# Patient Record
Sex: FEMALE | Race: BLACK OR AFRICAN AMERICAN | NOT HISPANIC OR LATINO | Employment: PART TIME | URBAN - METROPOLITAN AREA
[De-identification: names, ages, dates, MRNs, and addresses within clinical notes are randomized per-mention and may not be internally consistent; named-entity substitution may affect disease eponyms.]

---

## 2019-10-24 ENCOUNTER — OFFICE VISIT (OUTPATIENT)
Dept: FAMILY MEDICINE | Facility: CLINIC | Age: 29
End: 2019-10-24
Payer: MEDICAID

## 2019-10-24 VITALS
SYSTOLIC BLOOD PRESSURE: 116 MMHG | DIASTOLIC BLOOD PRESSURE: 68 MMHG | TEMPERATURE: 98 F | HEIGHT: 68 IN | RESPIRATION RATE: 16 BRPM | WEIGHT: 123.38 LBS | BODY MASS INDEX: 18.7 KG/M2

## 2019-10-24 DIAGNOSIS — Z76.89 ENCOUNTER TO ESTABLISH CARE: ICD-10-CM

## 2019-10-24 DIAGNOSIS — R63.4 WEIGHT LOSS, NON-INTENTIONAL: ICD-10-CM

## 2019-10-24 DIAGNOSIS — J30.89 ENVIRONMENTAL AND SEASONAL ALLERGIES: ICD-10-CM

## 2019-10-24 DIAGNOSIS — Z00.00 WELLNESS EXAMINATION: Primary | ICD-10-CM

## 2019-10-24 LAB
ABS NRBC COUNT: 0 X 10 3/UL (ref 0–0.01)
ABSOLUTE BASOPHIL: 0.04 X 10 3/UL (ref 0–0.22)
ABSOLUTE EOSINOPHIL: 0.05 X 10 3/UL (ref 0.04–0.54)
ABSOLUTE IMMATURE GRAN: 0.02 X 10 3/UL (ref 0–0.04)
ABSOLUTE LYMPHOCYTE: 1.32 X 10 3/UL (ref 0.86–4.75)
ABSOLUTE MONOCYTE: 0.83 X 10 3/UL (ref 0.22–1.08)
ALBUMIN SERPL-MCNC: 4.3 G/DL (ref 3.5–5.2)
ALBUMIN/GLOB SERPL ELPH: 1.2 {RATIO} (ref 1–2.7)
ALP ISOS SERPL LEV INH-CCNC: 171 U/L (ref 35–105)
ALT (SGPT): 41 U/L (ref 0–33)
ANION GAP SERPL CALC-SCNC: 11 MMOL/L (ref 8–17)
AST SERPL-CCNC: 44 U/L (ref 0–32)
B12: 1693 PG/ML (ref 232–1245)
BASOPHILS NFR BLD: 0.6 % (ref 0.2–1.2)
BILIRUBIN, TOTAL: 0.5 MG/DL (ref 0–1.2)
BUN/CREAT SERPL: 11.9 (ref 6–20)
CALCIUM SERPL-MCNC: 9.5 MG/DL (ref 8.6–10.2)
CARBON DIOXIDE, CO2: 26 MMOL/L (ref 22–29)
CHLORIDE: 103 MMOL/L (ref 98–107)
CHOLEST SERPL-MSCNC: 163 MG/DL (ref 100–200)
CREAT SERPL-MCNC: 1.18 MG/DL (ref 0.5–0.9)
EOSINOPHIL NFR BLD: 0.7 % (ref 0.7–7)
ESTIMATED AVERAGE GLUCOSE: 114 MG/DL
GFR ESTIMATION: 54.94
GLOBULIN: 3.5 G/DL (ref 1.5–4.5)
GLUCOSE: 91 MG/DL (ref 74–106)
HBA1C MFR BLD: 5.6 % (ref 4–6)
HCT VFR BLD AUTO: 38.7 % (ref 37–47)
HDLC SERPL-MCNC: 66 MG/DL
HGB BLD-MCNC: 13.3 G/DL (ref 12–16)
IMMATURE GRANULOCYTES: 0.3 % (ref 0–0.5)
LDL/HDL RATIO: 1.2 (ref 1–3)
LDLC SERPL CALC-MCNC: 81.4 MG/DL (ref 0–100)
LYMPHOCYTES NFR BLD: 19.5 % (ref 19.3–53.1)
MCH RBC QN AUTO: 28.9 PG (ref 27–32)
MCHC RBC AUTO-ENTMCNC: 34.4 G/DL (ref 32–36)
MCV RBC AUTO: 83.9 FL (ref 82–100)
MONOCYTES NFR BLD: 12.3 % (ref 4.7–12.5)
NEUTROPHILS ABSOLUTE COUNT: 4.5 X 10 3/UL (ref 2.15–7.56)
NEUTROPHILS NFR BLD: 66.6 %
NUCLEATED RED BLOOD CELLS: 0 /100 WBC (ref 0–0.2)
PLATELET # BLD AUTO: 436 X 10 3/UL (ref 135–400)
POTASSIUM: 4.8 MMOL/L (ref 3.5–5.1)
PROT SNV-MCNC: 7.8 G/DL (ref 6.4–8.3)
RBC # BLD AUTO: 4.61 X 10 6/UL (ref 4.2–5.4)
RDW-SD: 39.3 FL (ref 37–54)
SODIUM: 140 MMOL/L (ref 136–145)
TRIGL SERPL-MCNC: 78 MG/DL (ref 0–150)
TSH SERPL DL<=0.005 MIU/L-ACNC: 2.79 UIU/ML (ref 0.27–4.2)
UREA NITROGEN (BUN): 14 MG/DL (ref 6–20)
VITAMIN D (25OHD): 18 NG/ML
WBC # BLD: 6.76 X 10 3/UL (ref 4.3–10.8)

## 2019-10-24 PROCEDURE — 99385 PR PREVENTIVE VISIT,NEW,18-39: ICD-10-PCS | Mod: S$GLB,,, | Performed by: FAMILY MEDICINE

## 2019-10-24 PROCEDURE — 99385 PREV VISIT NEW AGE 18-39: CPT | Mod: S$GLB,,, | Performed by: FAMILY MEDICINE

## 2019-10-24 RX ORDER — MONTELUKAST SODIUM 10 MG/1
10 TABLET ORAL NIGHTLY
Qty: 90 TABLET | Refills: 3 | Status: SHIPPED | OUTPATIENT
Start: 2019-10-24

## 2019-10-24 NOTE — PROGRESS NOTES
Subjective:       Patient ID: Ania Mcgovern is a 28 y.o. female.    Chief Complaint: Establish Care (Pt states that she is having problems breathing states her nose gets stops up for 3 weeks at a time.pt stats that she has lost her since of taste and states that she is conserned about her weight loss she use to weigh 160 now is 123)    27 yo F here to get established. Pt was not seen by PCP in the past. Pt is here for unintentional weight loss.  Pt moved about 2 months ago in her remodeled appt and that's when it all started: she could not breathe, could not smell and she ate less. She thinks she lost about 40 pounds in the past 2 months. Pt has no other side effects, no pain, no diarrhea, no sweating.   Pt has three kids, one in heaven, one in school.   Pt has been congested but it is getting better. This started when she moved into this appt. She is actually better at her sister's house across town.       Review of Systems   Constitutional: Negative for activity change, chills, fatigue, fever and unexpected weight change.   HENT: Negative for ear pain, rhinorrhea and trouble swallowing.    Eyes: Negative for pain.   Respiratory: Negative for cough, chest tightness, shortness of breath and wheezing.    Cardiovascular: Negative for chest pain and palpitations.   Gastrointestinal: Negative for abdominal distention, abdominal pain, constipation, diarrhea, nausea and vomiting.   Endocrine: Negative for cold intolerance and heat intolerance.   Genitourinary: Negative for dysuria, frequency and urgency.   Musculoskeletal: Negative for myalgias.   Skin: Negative for rash.   Neurological: Negative for dizziness, syncope, light-headedness and headaches.   Hematological: Does not bruise/bleed easily.   Psychiatric/Behavioral: Negative for agitation and confusion.       Objective:      Physical Exam   Constitutional: She appears well-developed.   HENT:   Right Ear: External ear normal.   Left Ear: External ear normal.    Mouth/Throat: Oropharynx is clear and moist.   Eyes: Conjunctivae and EOM are normal.   Neck: Normal range of motion.   Cardiovascular: Normal rate, regular rhythm and intact distal pulses.   Pulmonary/Chest: Effort normal and breath sounds normal.   Abdominal: Soft.   Skin: Skin is warm. Capillary refill takes less than 2 seconds.   Psychiatric: She has a normal mood and affect.       Assessment:       1. Wellness examination    2. Encounter to establish care    3. Weight loss, non-intentional    4. BMI less than 19,adult    5. Environmental and seasonal allergies        Plan:       PROBLEM LIST     Ania was seen today for establish care.    Diagnoses and all orders for this visit:    Wellness examination  -     CBC auto differential; Future  -     Comprehensive metabolic panel; Future  -     Lipid panel; Future  -     Vitamin D; Future  -     Hemoglobin A1c; Future  -     TSH; Future  -     Vitamin B12; Future  -     CBC auto differential  -     Comprehensive metabolic panel  -     Lipid panel  -     Vitamin D  -     Hemoglobin A1c  -     TSH  -     Vitamin B12    Encounter to establish care    Weight loss, non-intentional    BMI less than 19,adult    Environmental and seasonal allergies  -     montelukast (SINGULAIR) 10 mg tablet; Take 1 tablet (10 mg total) by mouth every evening.

## 2019-10-28 ENCOUNTER — OFFICE VISIT (OUTPATIENT)
Dept: FAMILY MEDICINE | Facility: CLINIC | Age: 29
End: 2019-10-28
Payer: MEDICAID

## 2019-10-28 VITALS
HEART RATE: 90 BPM | WEIGHT: 122 LBS | BODY MASS INDEX: 19.15 KG/M2 | DIASTOLIC BLOOD PRESSURE: 80 MMHG | HEIGHT: 67 IN | RESPIRATION RATE: 16 BRPM | SYSTOLIC BLOOD PRESSURE: 120 MMHG | TEMPERATURE: 98 F

## 2019-10-28 DIAGNOSIS — G47.00 INSOMNIA, UNSPECIFIED TYPE: ICD-10-CM

## 2019-10-28 DIAGNOSIS — R63.4 WEIGHT LOSS, NON-INTENTIONAL: ICD-10-CM

## 2019-10-28 DIAGNOSIS — R74.01 TRANSAMINITIS: ICD-10-CM

## 2019-10-28 DIAGNOSIS — N18.30 CKD (CHRONIC KIDNEY DISEASE), STAGE III: ICD-10-CM

## 2019-10-28 DIAGNOSIS — E55.9 VITAMIN D DEFICIENCY: Primary | ICD-10-CM

## 2019-10-28 DIAGNOSIS — F41.9 ANXIETY: ICD-10-CM

## 2019-10-28 PROCEDURE — 99214 PR OFFICE/OUTPT VISIT, EST, LEVL IV, 30-39 MIN: ICD-10-PCS | Mod: S$GLB,,, | Performed by: FAMILY MEDICINE

## 2019-10-28 PROCEDURE — 99214 OFFICE O/P EST MOD 30 MIN: CPT | Mod: S$GLB,,, | Performed by: FAMILY MEDICINE

## 2019-10-28 RX ORDER — HYDROXYZINE HYDROCHLORIDE 50 MG/1
50 TABLET, FILM COATED ORAL NIGHTLY PRN
Qty: 30 TABLET | Refills: 0 | Status: SHIPPED | OUTPATIENT
Start: 2019-10-28

## 2019-10-28 NOTE — PROGRESS NOTES
Subjective:       Patient ID: Ania Mcgovern is a 28 y.o. female.    Chief Complaint: Follow-up (Lab results)    27 yo F here to discuss lab results. They are pretty much normal besides a slight transaminitis.  Her vitamin D is low at 18 (pt started supplements just recently) and she has some kidney insufficiency which might be CKD III. Discussed to not get dehydrated.   Pt also tells me that she has anxiety, she is over thinking everything.   Pt started to drink ensure as she is keeps losing weight. She lost another pound in 5 days. Pt feels well, she does suffer from night sweats or anything. Her TSH is normal, her A1C is normal. She can eat and has no problems with that.   Discussed that we will have to test for tape worms or such if she doesn't start gaining here soon.       Review of Systems   Constitutional: Negative for activity change, chills, fatigue, fever and unexpected weight change.   HENT: Negative for ear pain, rhinorrhea and trouble swallowing.    Eyes: Negative for pain.   Respiratory: Negative for cough, chest tightness, shortness of breath and wheezing.    Cardiovascular: Negative for chest pain and palpitations.   Gastrointestinal: Negative for abdominal distention, abdominal pain, constipation, diarrhea, nausea and vomiting.   Endocrine: Negative for cold intolerance and heat intolerance.   Genitourinary: Negative for dysuria, frequency and urgency.   Musculoskeletal: Negative for myalgias.   Skin: Negative for rash.   Neurological: Negative for dizziness, syncope, light-headedness and headaches.   Hematological: Does not bruise/bleed easily.   Psychiatric/Behavioral: Negative for agitation and confusion.       Objective:      Physical Exam   Constitutional: She appears well-developed.   HENT:   Right Ear: External ear normal.   Left Ear: External ear normal.   Mouth/Throat: Oropharynx is clear and moist.   Eyes: Conjunctivae and EOM are normal.   Neck: Normal range of motion.   Cardiovascular:  Normal rate, regular rhythm and intact distal pulses.   Pulmonary/Chest: Effort normal and breath sounds normal.   Abdominal: Soft.   Skin: Skin is warm. Capillary refill takes less than 2 seconds.   Psychiatric: She has a normal mood and affect.       Assessment:       1. Vitamin D deficiency    2. CKD (chronic kidney disease), stage III    3. Transaminitis    4. Insomnia, unspecified type    5. Anxiety    6. Weight loss, non-intentional        Plan:       PROBLEM LIST     Ania was seen today for follow-up.    Diagnoses and all orders for this visit:    Vitamin D deficiency  Comments:  new dx; 19 on 10/19    CKD (chronic kidney disease), stage III  Comments:  new dx; gfr = 59, cr = 1.12    Transaminitis  Comments:  new dx; very slight on 10/19, monitor    Insomnia, unspecified type  -     hydrOXYzine (ATARAX) 50 MG tablet; Take 1 tablet (50 mg total) by mouth nightly as needed for Anxiety.    Anxiety  -     hydrOXYzine (ATARAX) 50 MG tablet; Take 1 tablet (50 mg total) by mouth nightly as needed for Anxiety.    Weight loss, non-intentional